# Patient Record
Sex: FEMALE | HISPANIC OR LATINO | URBAN - METROPOLITAN AREA
[De-identification: names, ages, dates, MRNs, and addresses within clinical notes are randomized per-mention and may not be internally consistent; named-entity substitution may affect disease eponyms.]

---

## 2017-07-19 ENCOUNTER — NEW PATIENT (OUTPATIENT)
Dept: URBAN - METROPOLITAN AREA CLINIC 56 | Facility: CLINIC | Age: 82
End: 2017-07-19
Payer: MEDICARE

## 2017-07-19 DIAGNOSIS — E11.9 TYPE 2 DIABETES MELLITUS WITHOUT COMPLICATIONS: Primary | ICD-10-CM

## 2017-07-19 DIAGNOSIS — Z79.84 LONG TERM (CURRENT) USE OF ORAL ANTIDIABETIC DRUGS: ICD-10-CM

## 2017-07-19 PROCEDURE — 92250 FUNDUS PHOTOGRAPHY W/I&R: CPT | Performed by: OPTOMETRIST

## 2017-07-19 PROCEDURE — 92004 COMPRE OPH EXAM NEW PT 1/>: CPT | Performed by: OPTOMETRIST

## 2017-07-19 ASSESSMENT — INTRAOCULAR PRESSURE
OD: 12
OS: 12

## 2017-07-19 ASSESSMENT — KERATOMETRY: OD: 46.52

## 2017-07-19 ASSESSMENT — VISUAL ACUITY
OS: 20/40
OD: 20/40

## 2017-09-08 ENCOUNTER — Encounter (OUTPATIENT)
Dept: URBAN - METROPOLITAN AREA CLINIC 56 | Facility: CLINIC | Age: 82
End: 2017-09-08
Payer: MEDICARE

## 2017-09-08 DIAGNOSIS — Z01.818 ENCOUNTER FOR OTHER PREPROCEDURAL EXAMINATION: Primary | ICD-10-CM

## 2017-09-08 DIAGNOSIS — H25.13 AGE-RELATED NUCLEAR CATARACT, BILATERAL: ICD-10-CM

## 2017-09-08 PROCEDURE — 99213 OFFICE O/P EST LOW 20 MIN: CPT | Performed by: PHYSICIAN ASSISTANT

## 2017-09-14 ENCOUNTER — FOLLOW UP ESTABLISHED (OUTPATIENT)
Dept: URBAN - METROPOLITAN AREA CLINIC 44 | Facility: CLINIC | Age: 82
End: 2017-09-14
Payer: MEDICARE

## 2017-09-14 DIAGNOSIS — H25.11 AGE-RELATED NUCLEAR CATARACT, RIGHT EYE: Primary | ICD-10-CM

## 2017-09-14 DIAGNOSIS — H25.12 AGE-RELATED NUCLEAR CATARACT, LEFT EYE: ICD-10-CM

## 2017-09-14 DIAGNOSIS — H40.033 ANATOMICAL NARROW ANGLE, BILATERAL: ICD-10-CM

## 2017-09-14 PROCEDURE — 92012 INTRM OPH EXAM EST PATIENT: CPT | Performed by: OPHTHALMOLOGY

## 2017-09-14 PROCEDURE — 92020 GONIOSCOPY: CPT | Performed by: OPHTHALMOLOGY

## 2017-09-14 ASSESSMENT — INTRAOCULAR PRESSURE
OS: 18
OD: 17

## 2017-09-21 ENCOUNTER — SURGERY (OUTPATIENT)
Dept: URBAN - METROPOLITAN AREA SURGERY 19 | Facility: SURGERY | Age: 82
End: 2017-09-21
Payer: MEDICARE

## 2017-09-21 PROCEDURE — 66984 XCAPSL CTRC RMVL W/O ECP: CPT | Performed by: OPHTHALMOLOGY

## 2017-09-22 ENCOUNTER — POST OP (OUTPATIENT)
Dept: URBAN - METROPOLITAN AREA CLINIC 56 | Facility: CLINIC | Age: 82
End: 2017-09-22

## 2017-09-22 PROCEDURE — 99024 POSTOP FOLLOW-UP VISIT: CPT | Performed by: OPTOMETRIST

## 2017-09-22 ASSESSMENT — INTRAOCULAR PRESSURE: OD: 15

## 2017-09-28 ENCOUNTER — POST OP (OUTPATIENT)
Dept: URBAN - METROPOLITAN AREA CLINIC 56 | Facility: CLINIC | Age: 82
End: 2017-09-28

## 2017-09-28 PROCEDURE — 99024 POSTOP FOLLOW-UP VISIT: CPT | Performed by: OPTOMETRIST

## 2017-09-28 RX ORDER — POLYETHYLENE GLYCOL 400 AND PROPYLENE GLYCOL 4; 3 MG/ML; MG/ML
SOLUTION/ DROPS OPHTHALMIC
Qty: 0 | Refills: 0 | Status: INACTIVE
Start: 2017-09-28 | End: 2019-03-12

## 2017-09-28 ASSESSMENT — VISUAL ACUITY
OS: 20/30
OD: 20/25

## 2017-09-28 ASSESSMENT — INTRAOCULAR PRESSURE
OS: 15
OD: 14

## 2017-10-05 ENCOUNTER — SURGERY (OUTPATIENT)
Dept: URBAN - METROPOLITAN AREA SURGERY 19 | Facility: SURGERY | Age: 82
End: 2017-10-05
Payer: MEDICARE

## 2017-10-05 PROCEDURE — 66984 XCAPSL CTRC RMVL W/O ECP: CPT | Performed by: OPHTHALMOLOGY

## 2017-10-06 ENCOUNTER — POST OP (OUTPATIENT)
Dept: URBAN - METROPOLITAN AREA CLINIC 56 | Facility: CLINIC | Age: 82
End: 2017-10-06

## 2017-10-06 DIAGNOSIS — Z96.1 PRESENCE OF INTRAOCULAR LENS: Primary | ICD-10-CM

## 2017-10-06 PROCEDURE — 99024 POSTOP FOLLOW-UP VISIT: CPT | Performed by: OPTOMETRIST

## 2017-10-06 ASSESSMENT — INTRAOCULAR PRESSURE: OS: 12

## 2017-10-26 ENCOUNTER — POST OP (OUTPATIENT)
Dept: URBAN - METROPOLITAN AREA CLINIC 56 | Facility: CLINIC | Age: 82
End: 2017-10-26

## 2017-10-26 PROCEDURE — 99024 POSTOP FOLLOW-UP VISIT: CPT | Performed by: OPTOMETRIST

## 2017-10-26 ASSESSMENT — INTRAOCULAR PRESSURE
OD: 11
OS: 13

## 2017-10-26 ASSESSMENT — VISUAL ACUITY
OS: 20/20
OD: 20/25

## 2017-11-20 ENCOUNTER — POST OP (OUTPATIENT)
Dept: URBAN - METROPOLITAN AREA CLINIC 56 | Facility: CLINIC | Age: 82
End: 2017-11-20

## 2017-11-20 PROCEDURE — 99024 POSTOP FOLLOW-UP VISIT: CPT | Performed by: OPTOMETRIST

## 2017-11-20 ASSESSMENT — INTRAOCULAR PRESSURE
OD: 16
OS: 16

## 2017-11-20 ASSESSMENT — VISUAL ACUITY
OS: 20/20
OD: 20/25

## 2019-03-12 ENCOUNTER — FOLLOW UP ESTABLISHED (OUTPATIENT)
Dept: URBAN - METROPOLITAN AREA CLINIC 56 | Facility: CLINIC | Age: 84
End: 2019-03-12
Payer: MEDICARE

## 2019-03-12 DIAGNOSIS — H04.123 TEAR FILM INSUFFICIENCY OF BILATERAL LACRIMAL GLANDS: ICD-10-CM

## 2019-03-12 PROCEDURE — 92014 COMPRE OPH EXAM EST PT 1/>: CPT | Performed by: OPTOMETRIST

## 2019-03-12 PROCEDURE — 92250 FUNDUS PHOTOGRAPHY W/I&R: CPT | Performed by: OPTOMETRIST

## 2019-03-12 ASSESSMENT — INTRAOCULAR PRESSURE
OS: 17
OD: 17

## 2019-03-12 ASSESSMENT — VISUAL ACUITY
OS: 20/20
OD: 20/25

## 2019-03-12 ASSESSMENT — KERATOMETRY
OD: 46.17
OS: 45.77

## 2021-12-01 ENCOUNTER — OFFICE VISIT (OUTPATIENT)
Dept: URBAN - METROPOLITAN AREA CLINIC 56 | Facility: CLINIC | Age: 86
End: 2021-12-01
Payer: MEDICARE

## 2021-12-01 DIAGNOSIS — H53.2 DIPLOPIA: ICD-10-CM

## 2021-12-01 DIAGNOSIS — H26.493 OTHER SECONDARY CATARACT, BILATERAL: ICD-10-CM

## 2021-12-01 PROCEDURE — 92134 CPTRZ OPH DX IMG PST SGM RTA: CPT | Performed by: OPTOMETRIST

## 2021-12-01 PROCEDURE — 92250 FUNDUS PHOTOGRAPHY W/I&R: CPT | Performed by: OPTOMETRIST

## 2021-12-01 PROCEDURE — 92014 COMPRE OPH EXAM EST PT 1/>: CPT | Performed by: OPTOMETRIST

## 2021-12-01 ASSESSMENT — INTRAOCULAR PRESSURE
OS: 18
OD: 18

## 2021-12-01 ASSESSMENT — VISUAL ACUITY
OS: 20/25
OD: 20/30

## 2021-12-01 ASSESSMENT — KERATOMETRY
OS: 45.83
OD: 46.05

## 2021-12-01 NOTE — IMPRESSION/PLAN
Impression: Type 2 diabetes mellitus w/o complication: E61.4. Plan: Discussed with the patient my findings. No diabetic retinopathy on today's exam.  Patient encourage to monitor blood glucose and advised to call office if notes any sudden changes in vision. Patient informed the importance for regular diabetic exams.

## 2021-12-01 NOTE — IMPRESSION/PLAN
Impression: Diplopia: H53.2. Plan: Longstanding decompensating Right exotropia with Hypo deviaition. The deviation is commitant and there is no ptosis, no anisocoria and no Adduction deficit. NO treatment indicated. Plan: Monitor with annual comprehensive examination.

## 2021-12-01 NOTE — IMPRESSION/PLAN
Impression: Other secondary cataract, bilateral: H26.493. Plan: Not visually significant. Observe until vision decreases.  Will re-evaluate at the next annual comprehensive eye exam.

## 2023-04-24 ENCOUNTER — OFFICE VISIT (OUTPATIENT)
Dept: URBAN - METROPOLITAN AREA CLINIC 56 | Facility: LOCATION | Age: 88
End: 2023-04-24
Payer: OTHER MISCELLANEOUS

## 2023-04-24 DIAGNOSIS — Z96.1 PRESENCE OF INTRAOCULAR LENS: ICD-10-CM

## 2023-04-24 DIAGNOSIS — H26.493 OTHER SECONDARY CATARACT, BILATERAL: ICD-10-CM

## 2023-04-24 DIAGNOSIS — Z79.84 LONG TERM (CURRENT) USE OF ORAL ANTIDIABETIC DRUGS: ICD-10-CM

## 2023-04-24 DIAGNOSIS — E11.9 TYPE 2 DIABETES MELLITUS WITHOUT COMPLICATIONS: Primary | ICD-10-CM

## 2023-04-24 PROCEDURE — 92014 COMPRE OPH EXAM EST PT 1/>: CPT | Performed by: OPTOMETRIST

## 2023-04-24 PROCEDURE — 92134 CPTRZ OPH DX IMG PST SGM RTA: CPT | Performed by: OPTOMETRIST

## 2023-04-24 ASSESSMENT — KERATOMETRY
OS: 45.89
OD: 45.88

## 2023-04-24 ASSESSMENT — INTRAOCULAR PRESSURE
OS: 16
OD: 16

## 2023-04-24 ASSESSMENT — VISUAL ACUITY
OD: 20/20
OS: 20/20

## 2023-04-24 NOTE — IMPRESSION/PLAN
Impression: Type 2 diabetes mellitus without complications: U29.5. Plan: No diabetic retinopathy. Recommend yearly diabetic eye exam. Discussed with patient importance of good blood sugar control.